# Patient Record
Sex: FEMALE | Race: BLACK OR AFRICAN AMERICAN | NOT HISPANIC OR LATINO | URBAN - METROPOLITAN AREA
[De-identification: names, ages, dates, MRNs, and addresses within clinical notes are randomized per-mention and may not be internally consistent; named-entity substitution may affect disease eponyms.]

---

## 2020-07-25 ENCOUNTER — TELEPHONE ENCOUNTER (OUTPATIENT)
Dept: URBAN - METROPOLITAN AREA CLINIC 13 | Facility: CLINIC | Age: 60
End: 2020-07-25

## 2020-07-25 RX ORDER — LOSARTAN POTASSIUM 25 MG/1
TAKE 1 TABLET DAILY TABLET, FILM COATED ORAL
Refills: 0 | OUTPATIENT
End: 2015-05-04

## 2020-07-25 RX ORDER — LECITHIN 1200 MG
TAKE AS DIRECTED CAPSULE ORAL
Refills: 0 | OUTPATIENT
Start: 2008-08-01 | End: 2015-02-02

## 2020-07-25 RX ORDER — CIPROFLOXACIN HYDROCHLORIDE 500 MG/1
TAKE 1 TABLET DAILY PRN TABLET, FILM COATED ORAL
Refills: 0 | OUTPATIENT
End: 2015-05-04

## 2020-07-25 RX ORDER — OMEPRAZOLE 20 MG/1
TAKE 1 CAPSULE DAILY CAPSULE, DELAYED RELEASE ORAL
Qty: 30 | Refills: 11 | OUTPATIENT
Start: 2015-02-02 | End: 2015-05-04

## 2020-07-25 RX ORDER — LISINOPRIL 10 MG/1
TAKE 1 TABLET DAILY TABLET ORAL
Refills: 0 | OUTPATIENT
End: 2015-03-25

## 2020-07-25 RX ORDER — POLYETHYLENE GLYCOL 3350, SODIUM CHLORIDE, SODIUM BICARBONATE AND POTASSIUM CHLORIDE WITH LEMON FLAVOR 420; 11.2; 5.72; 1.48 G/4L; G/4L; G/4L; G/4L
USE AS DIRECTED POWDER, FOR SOLUTION ORAL
Qty: 1 | Refills: 0 | OUTPATIENT
Start: 2015-02-02 | End: 2015-02-17

## 2020-07-26 ENCOUNTER — TELEPHONE ENCOUNTER (OUTPATIENT)
Dept: URBAN - METROPOLITAN AREA CLINIC 13 | Facility: CLINIC | Age: 60
End: 2020-07-26

## 2020-07-26 RX ORDER — PREDNISONE 20 MG/1
TABLET ORAL
Qty: 10 | Refills: 0 | Status: ACTIVE | COMMUNITY
Start: 2014-03-14

## 2020-07-26 RX ORDER — NAPROXEN 500 MG/1
TABLET ORAL
Qty: 60 | Refills: 0 | Status: ACTIVE | COMMUNITY
Start: 2014-06-17

## 2020-07-26 RX ORDER — IBUPROFEN 600 MG/1
TABLET ORAL
Qty: 21 | Refills: 0 | Status: ACTIVE | COMMUNITY
Start: 2014-03-14

## 2020-07-26 RX ORDER — LEDIPASVIR AND SOFOSBUVIR 90; 400 MG/1; MG/1
TAKE 1 TABLET DAILY TABLET, FILM COATED ORAL
Qty: 30 | Refills: 5 | Status: ACTIVE | COMMUNITY
Start: 2015-04-22

## 2020-07-26 RX ORDER — AZITHROMYCIN DIHYDRATE 250 MG/1
TABLET, FILM COATED ORAL
Qty: 6 | Refills: 0 | Status: ACTIVE | COMMUNITY
Start: 2014-01-21

## 2020-07-26 RX ORDER — NAPROXEN 500 MG/1
TAKE 1 TABLET EVERY 12 HOURS AS NEEDED TABLET ORAL
Refills: 0 | Status: ACTIVE | COMMUNITY

## 2020-07-26 RX ORDER — CITALOPRAM 20 MG/1
TAKE 1 TAB ONCE DAILY TABLET ORAL
Qty: 30 | Refills: 0 | Status: ACTIVE | COMMUNITY

## 2020-07-26 RX ORDER — HYDROCHLOROTHIAZIDE 25 MG/1
TAKE 1 TABLET DAILY TABLET ORAL
Refills: 0 | Status: ACTIVE | COMMUNITY

## 2020-07-26 RX ORDER — HYDROCODONE BITARTRATE AND ACETAMINOPHEN 5; 325 MG/1; MG/1
TABLET ORAL
Qty: 20 | Refills: 0 | Status: ACTIVE | COMMUNITY
Start: 2014-10-26

## 2020-07-26 RX ORDER — MELOXICAM 15 MG/1
TAKE 1 TABLET DAILY TABLET ORAL
Qty: 30 | Refills: 0 | Status: ACTIVE | COMMUNITY
Start: 2014-03-25

## 2020-07-26 RX ORDER — NAPROXEN 500 MG/1
TABLET ORAL
Qty: 60 | Refills: 0 | Status: ACTIVE | COMMUNITY
Start: 2014-07-02

## 2020-07-26 RX ORDER — HYDROCODONE BITARTRATE AND ACETAMINOPHEN 5; 325 MG/1; MG/1
TABLET ORAL
Qty: 60 | Refills: 0 | Status: ACTIVE | COMMUNITY
Start: 2014-04-22

## 2020-07-26 RX ORDER — PROMETHAZINE HYDROCHLORIDE 25 MG/1
TABLET ORAL
Qty: 20 | Refills: 0 | Status: ACTIVE | COMMUNITY
Start: 2014-10-26

## 2020-07-26 RX ORDER — BENZONATATE 100 MG/1
CAPSULE ORAL
Qty: 60 | Refills: 0 | Status: ACTIVE | COMMUNITY
Start: 2014-01-21

## 2020-07-26 RX ORDER — HYDROCODONE BITARTRATE AND ACETAMINOPHEN 10; 325 MG/1; MG/1
TABLET ORAL
Qty: 120 | Refills: 0 | Status: ACTIVE | COMMUNITY
Start: 2015-05-05

## 2020-07-26 RX ORDER — HYDROCODONE BITARTRATE AND ACETAMINOPHEN 5; 325 MG/1; MG/1
TABLET ORAL
Qty: 15 | Refills: 0 | Status: ACTIVE | COMMUNITY
Start: 2014-03-14

## 2020-07-26 RX ORDER — HYDROCODONE BITARTRATE AND ACETAMINOPHEN 5; 325 MG/1; MG/1
TABLET ORAL
Qty: 40 | Refills: 0 | Status: ACTIVE | COMMUNITY
Start: 2014-07-02

## 2022-12-20 ENCOUNTER — DASHBOARD ENCOUNTERS (OUTPATIENT)
Age: 62
End: 2022-12-20

## 2022-12-20 ENCOUNTER — OFFICE VISIT (OUTPATIENT)
Dept: URBAN - METROPOLITAN AREA CLINIC 113 | Facility: CLINIC | Age: 62
End: 2022-12-20
Payer: COMMERCIAL

## 2022-12-20 ENCOUNTER — LAB OUTSIDE AN ENCOUNTER (OUTPATIENT)
Dept: URBAN - METROPOLITAN AREA CLINIC 113 | Facility: CLINIC | Age: 62
End: 2022-12-20

## 2022-12-20 VITALS
WEIGHT: 223 LBS | HEART RATE: 68 BPM | TEMPERATURE: 96.6 F | SYSTOLIC BLOOD PRESSURE: 152 MMHG | RESPIRATION RATE: 18 BRPM | BODY MASS INDEX: 43.78 KG/M2 | DIASTOLIC BLOOD PRESSURE: 82 MMHG | HEIGHT: 60 IN

## 2022-12-20 DIAGNOSIS — Z86.19 HISTORY OF HEPATITIS C: ICD-10-CM

## 2022-12-20 DIAGNOSIS — K76.82 HEPATIC ENCEPHALOPATHY: ICD-10-CM

## 2022-12-20 DIAGNOSIS — I78.1 SPIDER ANGIOMA OF SKIN: ICD-10-CM

## 2022-12-20 DIAGNOSIS — R12 HEARTBURN: ICD-10-CM

## 2022-12-20 DIAGNOSIS — E11.9 TYPE 2 DIABETES MELLITUS WITHOUT COMPLICATION, WITHOUT LONG-TERM CURRENT USE OF INSULIN: ICD-10-CM

## 2022-12-20 DIAGNOSIS — K59.04 CHRONIC IDIOPATHIC CONSTIPATION: ICD-10-CM

## 2022-12-20 DIAGNOSIS — R10.13 EPIGASTRIC ABDOMINAL PAIN: ICD-10-CM

## 2022-12-20 DIAGNOSIS — K76.6 PORTAL HYPERTENSION: ICD-10-CM

## 2022-12-20 DIAGNOSIS — K74.69 OTHER CIRRHOSIS OF LIVER: ICD-10-CM

## 2022-12-20 PROBLEM — 34742003: Status: ACTIVE | Noted: 2022-12-20

## 2022-12-20 PROBLEM — 82934008: Status: ACTIVE | Noted: 2022-12-20

## 2022-12-20 PROBLEM — 313436004: Status: ACTIVE | Noted: 2022-12-20

## 2022-12-20 PROCEDURE — 99205 OFFICE O/P NEW HI 60 MIN: CPT | Performed by: NURSE PRACTITIONER

## 2022-12-20 RX ORDER — NAPROXEN 500 MG/1
TAKE 1 TABLET EVERY 12 HOURS AS NEEDED TABLET ORAL
Refills: 0 | Status: ACTIVE | COMMUNITY

## 2022-12-20 RX ORDER — AMLODIPINE BESYLATE 5 MG/1
1 TABLET TABLET ORAL ONCE A DAY
Status: ACTIVE | COMMUNITY

## 2022-12-20 RX ORDER — IBUPROFEN 600 MG/1
TABLET ORAL
Qty: 21 | Refills: 0 | Status: ON HOLD | COMMUNITY
Start: 2014-03-14

## 2022-12-20 RX ORDER — LOSARTAN POTASSIUM 50 MG/1
1 TABLET TABLET ORAL ONCE A DAY
Status: ACTIVE | COMMUNITY

## 2022-12-20 RX ORDER — PREDNISONE 20 MG/1
TABLET ORAL
Qty: 10 | Refills: 0 | Status: ON HOLD | COMMUNITY
Start: 2014-03-14

## 2022-12-20 RX ORDER — AZITHROMYCIN DIHYDRATE 250 MG/1
TABLET, FILM COATED ORAL
Qty: 6 | Refills: 0 | Status: ON HOLD | COMMUNITY
Start: 2014-01-21

## 2022-12-20 RX ORDER — ONDANSETRON 4 MG/1
1 TABLET ON THE TONGUE AND ALLOW TO DISSOLVE TABLET, ORALLY DISINTEGRATING ORAL ONCE A DAY
Status: ACTIVE | COMMUNITY

## 2022-12-20 RX ORDER — BENZONATATE 100 MG/1
CAPSULE ORAL
Qty: 60 | Refills: 0 | Status: ACTIVE | COMMUNITY
Start: 2014-01-21

## 2022-12-20 RX ORDER — ONDANSETRON 4 MG/1
1 TABLET ON THE TONGUE AND ALLOW TO DISSOLVE TABLET, ORALLY DISINTEGRATING ORAL ONCE A DAY
OUTPATIENT

## 2022-12-20 RX ORDER — MELOXICAM 15 MG/1
TAKE 1 TABLET DAILY TABLET ORAL
OUTPATIENT
Start: 2014-03-25

## 2022-12-20 RX ORDER — MELOXICAM 15 MG/1
TAKE 1 TABLET DAILY TABLET ORAL
Qty: 30 | Refills: 0 | Status: ACTIVE | COMMUNITY
Start: 2014-03-25

## 2022-12-20 RX ORDER — NAPROXEN 500 MG/1
TAKE 1 TABLET EVERY 12 HOURS AS NEEDED TABLET ORAL
OUTPATIENT

## 2022-12-20 RX ORDER — DOCUSATE SODIUM 100 MG/1
1 CAPSULE AS NEEDED CAPSULE, LIQUID FILLED ORAL ONCE A DAY
Status: ACTIVE | COMMUNITY

## 2022-12-20 RX ORDER — CITALOPRAM 40 MG/1
TAKE 1 TAB ONCE DAILY TABLET, FILM COATED ORAL
Qty: 30 | Refills: 0 | Status: ACTIVE | COMMUNITY

## 2022-12-20 RX ORDER — ATENOLOL 25 MG/1
1 TABLET TABLET ORAL ONCE A DAY
Status: ACTIVE | COMMUNITY

## 2022-12-20 RX ORDER — HYDROCODONE BITARTRATE AND ACETAMINOPHEN 5; 325 MG/1; MG/1
TABLET ORAL
Qty: 15 | Refills: 0 | Status: ON HOLD | COMMUNITY
Start: 2014-03-14

## 2022-12-20 RX ORDER — LACTULOSE 10 G/15ML
15 ML AS NEEDED SOLUTION ORAL ONCE A DAY
Qty: 450 | Refills: 6 | OUTPATIENT

## 2022-12-20 RX ORDER — PANTOPRAZOLE SODIUM 40 MG/1
1 TABLET TABLET, DELAYED RELEASE ORAL ONCE A DAY
Qty: 90 TABLET | Refills: 3 | OUTPATIENT

## 2022-12-20 RX ORDER — ZINC GLUCONATE 50 MG
1 TABLET TABLET ORAL ONCE A DAY
Status: ACTIVE | COMMUNITY

## 2022-12-20 RX ORDER — HYDROCODONE BITARTRATE AND ACETAMINOPHEN 10; 325 MG/1; MG/1
TABLET ORAL
Qty: 120 | Refills: 0 | Status: ON HOLD | COMMUNITY
Start: 2015-05-05

## 2022-12-20 RX ORDER — LEDIPASVIR AND SOFOSBUVIR 90; 400 MG/1; MG/1
TAKE 1 TABLET DAILY TABLET, FILM COATED ORAL
Qty: 30 | Refills: 5 | Status: ON HOLD | COMMUNITY
Start: 2015-04-22

## 2022-12-20 RX ORDER — ROSUVASTATIN CALCIUM 20 MG/1
1 TABLET TABLET, FILM COATED ORAL ONCE A DAY
Status: ACTIVE | COMMUNITY

## 2022-12-20 RX ORDER — PROMETHAZINE HYDROCHLORIDE 25 MG/1
TABLET ORAL
Qty: 20 | Refills: 0 | Status: ON HOLD | COMMUNITY
Start: 2014-10-26

## 2022-12-20 RX ORDER — HYDROCHLOROTHIAZIDE 25 MG/1
TAKE 1 TABLET DAILY TABLET ORAL
Refills: 0 | Status: ON HOLD | COMMUNITY

## 2022-12-20 RX ORDER — METFORMIN HYDROCHLORIDE 500 MG/1
1 TABLET WITH A MEAL TABLET, FILM COATED ORAL ONCE A DAY
Status: ACTIVE | COMMUNITY

## 2022-12-20 NOTE — PHYSICAL EXAM SKIN:
no jaundice present; small spider angiomas located across the chest. There are multiple varicose veins in the lower extremities. There is a hard knot like prominence in the right underarm that is felt to be a prior ingrown hair that is scarred over.

## 2022-12-20 NOTE — HPI-TODAY'S VISIT:
62-year-old female with a history of hyperplastic colon polyps removed on colonoscopy in 2015, due for surveillance colonoscopy due in 2020, history of hepatitis C with liver biopsy in 2015 showing stage III/IV fibrosis treated with 24 weeks of Harvoni, presenting for evaluation of abnormal CT of the abdomen and pelvis performed 12/7/2022 in the ED at [facility].  She initially presented to the ED with complaints of upper abdominal pain with constipation and nausea ongoing for approximately a month.  Labs demonstrated a normal CBC with exception to low platelet count 131.  Bilirubin 0.7, AST 37, ALP 94, ALT 17, lipase 62.  Abdominal x-ray revealed nonobstructive bowel gas pattern.  No acute findings.  CT of the abdomen and pelvis with contrast revealed hepatic steatosis with cirrhotic morphology and sequela of portal hypertension.  There was no acute abdominal process.  She thinks that she did complete Harvoni treatment in 2015. Labs in August 2015 showed eradication of hepatitis C.  Interval history 12/20/22. She admits some confusion, and trouble with her memory. She is not sleeping well at nighttime. She stays up playing games on her tablet. She goes to bed around 1am or 2am. She denies any daytime drowsiness. She was previously working at [employer] for 20 years, but is not working currently. She tells me that she is tired. She denies any yellowing of her skin or eyes. No dark urine. There is no dysphagia. She admits heartburn, requiring frequent use of Rolaids. She has noted small areas of of red, broken blood vessels in her skin, located across her chest. She has two areas on the right side of her body: one on the right side of her neck that is hard, and one hardened lump in her underarm that is tender with palpation. There is upper abdominal aching, likened to needing to pass a bowel movement but is unable. She has nausea associated with the pain. She had some vomiting over the weekend associated with this pain. She admits constipation. Emesis was a combination of bile and food contents. She also thinks her emesis resembled coffee grounds. She has never had this before. She admits intermittent bloating and distention of her stomach associated with nausea and constipation. There is no swelling in the lower extremities or abdomen currently.

## 2022-12-23 PROBLEM — 19943007: Status: ACTIVE | Noted: 2022-12-20

## 2022-12-23 LAB
A/G RATIO: 1.4
ABSOLUTE BASOPHILS: 22
ABSOLUTE EOSINOPHILS: 130
ABSOLUTE LYMPHOCYTES: 1539
ABSOLUTE MONOCYTES: 340
ABSOLUTE NEUTROPHILS: 3370
AFP, SERUM, TUMOR MARKER: 4.6
ALBUMIN: 4
ALKALINE PHOSPHATASE: 76
ALT (SGPT): 12
AST (SGOT): 19
BASOPHILS: 0.4
BILIRUBIN, TOTAL: 0.6
BUN/CREATININE RATIO: 19
BUN: 9
CALCIUM: 8.9
CARBON DIOXIDE, TOTAL: 26
CHLORIDE: 103
CREATININE: 0.47
EGFR: 108
EOSINOPHILS: 2.4
GLOBULIN, TOTAL: 2.9
GLUCOSE: 112
HCV RNA, QUANTITATIVE: <1.18
HCV RNA, QUANTITATIVE: <15
HEMATOCRIT: 35.4
HEMOGLOBIN: 12.1
INR: 1.1
LYMPHOCYTES: 28.5
MCH: 28.9
MCHC: 34.2
MCV: 84.5
MONOCYTES: 6.3
MPV: 11.1
NEUTROPHILS: 62.4
PLATELET COUNT: 141
POTASSIUM: 4
PROTEIN, TOTAL: 6.9
PT: 11.3
RDW: 12.2
RED BLOOD CELL COUNT: 4.19
SODIUM: 140
WHITE BLOOD CELL COUNT: 5.4

## 2023-01-12 ENCOUNTER — OFFICE VISIT (OUTPATIENT)
Dept: URBAN - METROPOLITAN AREA MEDICAL CENTER 19 | Facility: MEDICAL CENTER | Age: 63
End: 2023-01-12

## 2023-01-12 RX ORDER — CITALOPRAM 40 MG/1
TAKE 1 TAB ONCE DAILY TABLET, FILM COATED ORAL
Qty: 30 | Refills: 0 | Status: ACTIVE | COMMUNITY

## 2023-01-12 RX ORDER — ONDANSETRON 4 MG/1
1 TABLET ON THE TONGUE AND ALLOW TO DISSOLVE TABLET, ORALLY DISINTEGRATING ORAL ONCE A DAY
Status: ACTIVE | COMMUNITY

## 2023-01-12 RX ORDER — BENZONATATE 100 MG/1
CAPSULE ORAL
Qty: 60 | Refills: 0 | Status: ACTIVE | COMMUNITY
Start: 2014-01-21

## 2023-01-12 RX ORDER — ZINC GLUCONATE 50 MG
1 TABLET TABLET ORAL ONCE A DAY
Status: ACTIVE | COMMUNITY

## 2023-01-12 RX ORDER — LACTULOSE 10 G/15ML
15 ML AS NEEDED SOLUTION ORAL ONCE A DAY
Qty: 450 | Refills: 6 | Status: ACTIVE | COMMUNITY

## 2023-01-12 RX ORDER — PREDNISONE 20 MG/1
TABLET ORAL
Qty: 10 | Refills: 0 | Status: ON HOLD | COMMUNITY
Start: 2014-03-14

## 2023-01-12 RX ORDER — AZITHROMYCIN DIHYDRATE 250 MG/1
TABLET, FILM COATED ORAL
Qty: 6 | Refills: 0 | Status: ON HOLD | COMMUNITY
Start: 2014-01-21

## 2023-01-12 RX ORDER — IBUPROFEN 600 MG/1
TABLET ORAL
Qty: 21 | Refills: 0 | Status: ON HOLD | COMMUNITY
Start: 2014-03-14

## 2023-01-12 RX ORDER — HYDROCODONE BITARTRATE AND ACETAMINOPHEN 5; 325 MG/1; MG/1
TABLET ORAL
Qty: 15 | Refills: 0 | Status: ON HOLD | COMMUNITY
Start: 2014-03-14

## 2023-01-12 RX ORDER — HYDROCHLOROTHIAZIDE 25 MG/1
TAKE 1 TABLET DAILY TABLET ORAL
Refills: 0 | Status: ON HOLD | COMMUNITY

## 2023-01-12 RX ORDER — AMLODIPINE BESYLATE 5 MG/1
1 TABLET TABLET ORAL ONCE A DAY
Status: ACTIVE | COMMUNITY

## 2023-01-12 RX ORDER — PROMETHAZINE HYDROCHLORIDE 25 MG/1
TABLET ORAL
Qty: 20 | Refills: 0 | Status: ON HOLD | COMMUNITY
Start: 2014-10-26

## 2023-01-12 RX ORDER — DOCUSATE SODIUM 100 MG/1
1 CAPSULE AS NEEDED CAPSULE, LIQUID FILLED ORAL ONCE A DAY
Status: ACTIVE | COMMUNITY

## 2023-01-12 RX ORDER — HYDROCODONE BITARTRATE AND ACETAMINOPHEN 10; 325 MG/1; MG/1
TABLET ORAL
Qty: 120 | Refills: 0 | Status: ON HOLD | COMMUNITY
Start: 2015-05-05

## 2023-01-12 RX ORDER — LOSARTAN POTASSIUM 50 MG/1
1 TABLET TABLET ORAL ONCE A DAY
Status: ACTIVE | COMMUNITY

## 2023-01-12 RX ORDER — ATENOLOL 25 MG/1
1 TABLET TABLET ORAL ONCE A DAY
Status: ACTIVE | COMMUNITY

## 2023-01-12 RX ORDER — ROSUVASTATIN CALCIUM 20 MG/1
1 TABLET TABLET, FILM COATED ORAL ONCE A DAY
Status: ACTIVE | COMMUNITY

## 2023-01-12 RX ORDER — PANTOPRAZOLE SODIUM 40 MG/1
1 TABLET TABLET, DELAYED RELEASE ORAL ONCE A DAY
Qty: 90 TABLET | Refills: 3 | Status: ACTIVE | COMMUNITY

## 2023-01-12 RX ORDER — METFORMIN HYDROCHLORIDE 500 MG/1
1 TABLET WITH A MEAL TABLET, FILM COATED ORAL ONCE A DAY
Status: ACTIVE | COMMUNITY

## 2023-01-12 RX ORDER — LEDIPASVIR AND SOFOSBUVIR 90; 400 MG/1; MG/1
TAKE 1 TABLET DAILY TABLET, FILM COATED ORAL
Qty: 30 | Refills: 5 | Status: ON HOLD | COMMUNITY
Start: 2015-04-22

## 2023-03-27 ENCOUNTER — TELEPHONE ENCOUNTER (OUTPATIENT)
Dept: URBAN - METROPOLITAN AREA CLINIC 113 | Facility: CLINIC | Age: 63
End: 2023-03-27